# Patient Record
Sex: FEMALE | Race: WHITE | NOT HISPANIC OR LATINO
[De-identification: names, ages, dates, MRNs, and addresses within clinical notes are randomized per-mention and may not be internally consistent; named-entity substitution may affect disease eponyms.]

---

## 2019-05-20 PROBLEM — Z00.00 ENCOUNTER FOR PREVENTIVE HEALTH EXAMINATION: Status: ACTIVE | Noted: 2019-05-20

## 2019-05-23 ENCOUNTER — RECORD ABSTRACTING (OUTPATIENT)
Age: 79
End: 2019-05-23

## 2019-05-23 VITALS — HEIGHT: 62 IN

## 2019-05-23 DIAGNOSIS — Z87.2 PERSONAL HISTORY OF DISEASES OF THE SKIN AND SUBCUTANEOUS TISSUE: ICD-10-CM

## 2019-05-23 DIAGNOSIS — Z86.79 PERSONAL HISTORY OF OTHER DISEASES OF THE CIRCULATORY SYSTEM: ICD-10-CM

## 2019-05-28 ENCOUNTER — APPOINTMENT (OUTPATIENT)
Dept: SURGERY | Facility: CLINIC | Age: 79
End: 2019-05-28

## 2019-06-05 ENCOUNTER — APPOINTMENT (OUTPATIENT)
Dept: SURGERY | Facility: CLINIC | Age: 79
End: 2019-06-05
Payer: MEDICARE

## 2019-06-05 VITALS
HEIGHT: 62 IN | RESPIRATION RATE: 20 BRPM | BODY MASS INDEX: 26.36 KG/M2 | SYSTOLIC BLOOD PRESSURE: 120 MMHG | DIASTOLIC BLOOD PRESSURE: 70 MMHG | WEIGHT: 143.25 LBS

## 2019-06-05 DIAGNOSIS — Z78.9 OTHER SPECIFIED HEALTH STATUS: ICD-10-CM

## 2019-06-05 PROCEDURE — 99213 OFFICE O/P EST LOW 20 MIN: CPT

## 2019-06-05 PROCEDURE — 99203 OFFICE O/P NEW LOW 30 MIN: CPT

## 2019-06-11 NOTE — CONSULT LETTER
[Dear  ___] : Dear  [unfilled], [Consult Letter:] : I had the pleasure of evaluating your patient, [unfilled]. [FreeTextEntry1] : (Hi Tete-\claire Shelbie saw me for her yearly (or near yearly) breast exam. No suspicious findings. Mammo/US neg. She'll f/u in one year. Thanks- Hope all is well with you- Dony

## 2019-06-11 NOTE — PHYSICAL EXAM
[Normal Heart Sounds] : normal heart sounds [Normal Breath Sounds] : Normal breath sounds [de-identified] : NAD [JVD] : no jugular venous distention  [de-identified] : soft pliable cystic changes LUOQ > RUOQ, no suspicious masses noted,

## 2019-06-11 NOTE — HISTORY OF PRESENT ILLNESS
[de-identified] : Pt known to me from previous visits for breast cysts. 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 Her last mammogram/US was 5/19/19 which identified a small right breast cyst (5mm). she has had most of her pain and "aches" in the LUOQ. She has had previously palpated cystic areas in this part of her breast. she drinks only one cup of coffee per day. no Fh of breast disease. she had been on HRT for 10 years. Is 3yrs s/p TAHBSO (Dr Shen- [facility]). \par Today she feels minimal breast tenderness in the LUOQ but wanted to be checked for any concerning masses. .

## 2019-06-11 NOTE — PLAN
[FreeTextEntry1] : yearly mammo/us both breasts in one year\par F/u one year\par Also is seeing an ENT for her thyroid nodules. \par Following up with Dr Simon